# Patient Record
Sex: FEMALE | Race: WHITE | NOT HISPANIC OR LATINO | URBAN - METROPOLITAN AREA
[De-identification: names, ages, dates, MRNs, and addresses within clinical notes are randomized per-mention and may not be internally consistent; named-entity substitution may affect disease eponyms.]

---

## 2019-02-13 ENCOUNTER — OFFICE (OUTPATIENT)
Dept: URBAN - METROPOLITAN AREA CLINIC 19 | Facility: CLINIC | Age: 49
End: 2019-02-13

## 2019-02-13 VITALS
DIASTOLIC BLOOD PRESSURE: 67 MMHG | HEIGHT: 66 IN | HEART RATE: 91 BPM | WEIGHT: 217 LBS | SYSTOLIC BLOOD PRESSURE: 124 MMHG

## 2019-02-13 DIAGNOSIS — K62.89 OTHER SPECIFIED DISEASES OF ANUS AND RECTUM: ICD-10-CM

## 2019-02-13 DIAGNOSIS — K60.2 ANAL FISSURE, UNSPECIFIED: ICD-10-CM

## 2019-02-13 DIAGNOSIS — K21.9 GASTRO-ESOPHAGEAL REFLUX DISEASE WITHOUT ESOPHAGITIS: ICD-10-CM

## 2019-02-13 DIAGNOSIS — K59.00 CONSTIPATION, UNSPECIFIED: ICD-10-CM

## 2019-02-13 DIAGNOSIS — R13.10 DYSPHAGIA, UNSPECIFIED: ICD-10-CM

## 2019-02-13 PROCEDURE — 99204 OFFICE O/P NEW MOD 45 MIN: CPT | Performed by: INTERNAL MEDICINE

## 2019-02-13 RX ORDER — OMEPRAZOLE 40 MG/1
40 CAPSULE, DELAYED RELEASE ORAL
Qty: 30 | Refills: 11 | Status: ACTIVE
Start: 2019-02-13

## 2019-02-13 RX ORDER — SODIUM PICOSULFATE, MAGNESIUM OXIDE, AND ANHYDROUS CITRIC ACID 10; 3.5; 12 MG/160ML; G/160ML; G/160ML
LIQUID ORAL
Qty: 1 | Refills: 0 | Status: ACTIVE
Start: 2019-02-13

## 2019-02-13 NOTE — SERVICENOTES
Given the patient's symptoms of intermittent red blood per rectum that is only a small amount and mainly just with wiping and given the fact that she was diagnosed with an anal fissure before I do believe this is consistent with her known anal fissure.  Because of her family history and persistent bleeding we will go ahead and schedule her for colonoscopy as well as EGD at the same time because of her reflux and dysphagia symptoms.  She was instructed to notify us if her fissure does not improve with the above medical treatment.  I also recommended she take her omeprazole on a daily basis for at least three months and at that point we can consider switching off to an H2 blocker or lower dose PPI.

## 2019-02-13 NOTE — SERVICEHPINOTES
Ms. Henderson is a 48-year-old female here for evaluation for colon polyp screening as well as some reflux, dysphagia, and intermittent painful rectal bleeding. The patient states that she has a family history of colon polyps in her father with greater than 20 polyps when he was in his 60s. Because of this and some painful rectal bleeding she underwent colonoscopy around 12 years ago in [place] which she states was normal with no polyps. At that time she was diagnosed as having an anal fissure. She states that she has had this pain for rectal bleeding off and on since then and does admit that she continues to have intermittent constipation issues with hard bowel movements which will cause pain in the anal canal associated with a scant amount of red blood with wiping. She states a few weeks ago there was some blood in the toilet but this has since returned to her normal scant amount of red blood with wiping once to twice a week. This usually only occurs with a hard bowel movement. She does not take anything for constipation. She also has longstanding reflux and underwent an EGD [place] which she states was negative. She states that she watches her diet which helped somewhat and will take some over-the-counter medicines occasionally. She was prescribed omeprazole but states that she does not take this on a regular basis. She states that she will occasionally get some difficulty with swallowing solid food but that this only occurs when she is having a flare of reflux and if her symptoms of reflux improve then the dysphagia will go away. She does admit to taking diclofenac twice a day. She denies any fevers, chills, nausea vomiting, or abdominal pain.